# Patient Record
Sex: FEMALE | Race: WHITE
[De-identification: names, ages, dates, MRNs, and addresses within clinical notes are randomized per-mention and may not be internally consistent; named-entity substitution may affect disease eponyms.]

---

## 2017-02-09 ENCOUNTER — HOSPITAL ENCOUNTER (OUTPATIENT)
Dept: HOSPITAL 53 - M PAIN | Age: 57
End: 2017-02-09
Attending: NURSE PRACTITIONER
Payer: COMMERCIAL

## 2017-02-09 DIAGNOSIS — Z53.8: Primary | ICD-10-CM

## 2017-08-31 ENCOUNTER — HOSPITAL ENCOUNTER (OUTPATIENT)
Dept: HOSPITAL 53 - M WHC | Age: 57
End: 2017-08-31
Attending: NURSE PRACTITIONER
Payer: COMMERCIAL

## 2017-08-31 DIAGNOSIS — Z80.41: ICD-10-CM

## 2017-08-31 DIAGNOSIS — Z12.31: Primary | ICD-10-CM

## 2017-08-31 DIAGNOSIS — R14.0: ICD-10-CM

## 2017-08-31 PROCEDURE — 76856 US EXAM PELVIC COMPLETE: CPT

## 2017-08-31 PROCEDURE — 76830 TRANSVAGINAL US NON-OB: CPT

## 2017-08-31 NOTE — REPMRS
Patient History

The patient states she had a clinical breast exam in 8/2017.

Patient is postmenopausal.

Family history of pancreatic cancer in father at age 39, ovarian 

cancer in maternal aunt, colorectal cancer in maternal aunt, and 

ovarian cancer in maternal grandmother.

 

Digital Woman Screen Mammo: August 31, 2017 - Exam #: 

[id number]

Bilateral CC and MLO view(s) were taken.

 

Technologist: Alyssia Chris Technologist

Prior study comparison: June 1, 2016, left breast digital mammo 

diagnostic unilateral, performed at [facility].  

May 25, 2016, digital woman screen mammo performed at [facility].  May 4, 2015, digital woman screen mammo 

performed at [facility].

 

FINDINGS: The breast tissue is heterogeneously dense.  This may 

lower the sensitivity of mammography.  There is a moderate amount

of heterogeneously dense fibroglandular tissue which is fairly 

symmetric. There is no interval development of dominant mass, 

architectural distortion, or clustered microcalcification typical

of malignancy. There has been no change in the appearance of the

mammogram from the prior studies.

 

ASSESSMENT: BI-RADS/ACR category 1 mammogram. Negative.

 

Recommendation

Routine screening mammogram of both breasts in 1 year (for women 

over age 40).

 

This mammogram was interpreted with the aid of an FDA-approved 

computer-aided dectection system.

 

Electronically Signed By: Alek Colunga MD 08/31/17 1016

## 2017-08-31 NOTE — REP
Pelvic sonography:

 

History:  Positive family history of ovarian carcinoma.  Abdominal bloating.

 

Findings:  Transabdominal and transvaginal scanning are performed.  Uterine

dimensions are normal at 7.8 x 2.5 x 4.1 cm.  Endometrial echo is 0.5 cm thick

and centrally placed.  Nabothian cysts are seen in the cervix, the largest is 1.1

cm in diameter.

 

Normal ovaries are seen bilaterally.  Right ovarian dimension is 1.8 x 0.9 x 1.7

cm.  Left ovary measures 1.6 x 0.9 x 1.7 cm.  Doppler flow is normal in both

ovaries.  Resistive indices are 0.53 on the right and 0.56 on the left.

 

Impression:

 

Normal pelvic sonography.

## 2018-09-04 ENCOUNTER — HOSPITAL ENCOUNTER (OUTPATIENT)
Dept: HOSPITAL 53 - M WHC | Age: 58
End: 2018-09-04
Attending: NURSE PRACTITIONER
Payer: COMMERCIAL

## 2018-09-04 DIAGNOSIS — Z12.31: Primary | ICD-10-CM

## 2018-10-23 ENCOUNTER — HOSPITAL ENCOUNTER (EMERGENCY)
Dept: HOSPITAL 53 - M ED | Age: 58
Discharge: HOME | End: 2018-10-23
Payer: COMMERCIAL

## 2018-10-23 DIAGNOSIS — K21.9: ICD-10-CM

## 2018-10-23 DIAGNOSIS — R06.02: Primary | ICD-10-CM

## 2018-10-23 DIAGNOSIS — Z88.2: ICD-10-CM

## 2018-10-23 DIAGNOSIS — Z88.1: ICD-10-CM

## 2018-10-23 LAB
ALBUMIN/GLOBULIN RATIO: 1.26 (ref 1–1.93)
ALBUMIN: 4.3 GM/DL (ref 3.2–5.2)
ALKALINE PHOSPHATASE: 139 U/L (ref 45–117)
ALT SERPL W P-5'-P-CCNC: 35 U/L (ref 12–78)
ANION GAP: 7 MEQ/L (ref 8–16)
AST SERPL-CCNC: 23 U/L (ref 7–37)
BASO #: 0 10^3/UL (ref 0–0.2)
BASO %: 0.4 % (ref 0–1)
BILIRUB CONJ SERPL-MCNC: 0.2 MG/DL (ref 0–0.2)
BILIRUBIN,TOTAL: 0.6 MG/DL (ref 0.2–1)
BLOOD UREA NITROGEN: 13 MG/DL (ref 7–18)
CALCIUM LEVEL: 9.3 MG/DL (ref 8.5–10.1)
CARBON DIOXIDE LEVEL: 30 MEQ/L (ref 21–32)
CHLORIDE LEVEL: 105 MEQ/L (ref 98–107)
CK MB CFR.DF SERPL CALC: 0.02
CK SERPL-CCNC: 83 U/L (ref 26–192)
CK-MB VALUE MASS: 1.5 NG/ML (ref ?–3.6)
CREATININE FOR GFR: 0.76 MG/DL (ref 0.55–1.3)
D-DIMER QUANT: < 270 NG/ML (ref ?–500)
EOS #: 0.1 10^3/UL (ref 0–0.5)
EOSINOPHIL NFR BLD AUTO: 1 % (ref 0–3)
GFR SERPL CREATININE-BSD FRML MDRD: > 60 ML/MIN/{1.73_M2} (ref 51–?)
GLUCOSE, FASTING: 82 MG/DL (ref 70–100)
GOLD SPEC TUBE: (no result)
HEMATOCRIT: 42.3 % (ref 36–47)
HEMOGLOBIN: 14.2 G/DL (ref 12–15.5)
IMMATURE GRANULOCYTE %: 0.3 % (ref 0–3)
INR: 0.91
LYMPH #: 2 10^3/UL (ref 1.5–4.5)
LYMPH %: 30.2 % (ref 24–44)
MEAN CORPUSCULAR HEMOGLOBIN: 32.1 PG (ref 27–33)
MEAN CORPUSCULAR HGB CONC: 33.6 G/DL (ref 32–36.5)
MEAN CORPUSCULAR VOLUME: 95.5 FL (ref 80–96)
MONO #: 0.4 10^3/UL (ref 0–0.8)
MONO %: 6.4 % (ref 0–5)
NEUTROPHILS #: 4.2 10^3/UL (ref 1.8–7.7)
NEUTROPHILS %: 61.7 % (ref 36–66)
NRBC BLD AUTO-RTO: 0 % (ref 0–0)
NT-PRO BNP: 24 PG/ML (ref ?–125)
PLATELET COUNT, AUTOMATED: 295 10^3/UL (ref 150–450)
POTASSIUM SERUM: 3.7 MEQ/L (ref 3.5–5.1)
PROTHROMBIN TIME: 12.3 SECONDS (ref 12.1–14.4)
RED BLOOD COUNT: 4.43 10^6/UL (ref 4–5.4)
RED CELL DISTRIBUTION WIDTH: 12.4 % (ref 11.5–14.5)
SODIUM LEVEL: 142 MEQ/L (ref 136–145)
THYROID STIMULATING HORMONE: 1.95 UIU/ML (ref 0.36–3.74)
THYROXINE (T4): 8.7 UG/DL (ref 4.5–12)
TOTAL PROTEIN: 7.7 GM/DL (ref 6.4–8.2)
TROPONIN I: < 0.02 NG/ML (ref ?–0.1)
WHITE BLOOD COUNT: 6.8 10^3/UL (ref 4–10)

## 2018-10-23 PROCEDURE — 71046 X-RAY EXAM CHEST 2 VIEWS: CPT

## 2019-09-05 ENCOUNTER — HOSPITAL ENCOUNTER (OUTPATIENT)
Dept: HOSPITAL 53 - M WHC | Age: 59
End: 2019-09-05
Attending: NURSE PRACTITIONER
Payer: COMMERCIAL

## 2019-09-05 ENCOUNTER — HOSPITAL ENCOUNTER (OUTPATIENT)
Dept: HOSPITAL 53 - M SFHCWAGY | Age: 59
End: 2019-09-05
Attending: NURSE PRACTITIONER
Payer: COMMERCIAL

## 2019-09-05 DIAGNOSIS — Z12.31: Primary | ICD-10-CM

## 2019-09-05 DIAGNOSIS — Z12.4: Primary | ICD-10-CM

## 2019-09-05 NOTE — REPMRS
Patient History

The patient states she had a clinical breast exam in 09/2019.

Patient is postmenopausal.

Family history of ovarian cancer and colorectal cancer in 

maternal aunt, ovarian cancer in maternal grandmother, pancreatic

cancer at age 39 in father.

No Hormone Replacement Therapy

 

Digital Woman Screen Mammo: September 5, 2019 - Exam #: 

[id number]

Bilateral CC and MLO view(s) were taken.

 

Technologist: Charleen Ramos, Technologist

Prior study comparison: September 4, 2018, bilateral digital 

woman screen mammo performed at [facility].

August 31, 2017, digital woman screen mammo performed at 

[facility].  May 25, 2016, digital woman 

screen mammo performed at [facility].

 

FINDINGS: The breast tissue is heterogeneously dense.  This may 

lower the sensitivity of mammography.  There is a moderate amount

of heterogeneously dense fibroglandular tissue which is fairly 

symmetric. There is no interval development of dominant mass, 

architectural distortion, or grouped microcalcification typical 

of malignancy. There has been no change in the appearance of the 

mammogram from the prior studies.

3-D tomosynthesis shows no additional findings.

 

Assessment: BI-RADS/ACR category 1 mammogram. Negative Mammogram.

 

Recommendation

Routine screening mammogram of both breasts in 1 year (for women 

over age 40).

This patient's Lifetime Breast Cancer RIsk is estimated at 9.2 %.

This mammogram was interpreted with the aid of an FDA-approved 

computer-aided dectection system.

 

Electronically Signed By: Alek Colunga MD 09/05/19 2284

## 2019-09-09 LAB — HPV LOW VOL RFLX: (no result)

## 2020-09-29 ENCOUNTER — HOSPITAL ENCOUNTER (OUTPATIENT)
Dept: HOSPITAL 53 - M WHC | Age: 60
End: 2020-09-29
Attending: NURSE PRACTITIONER
Payer: COMMERCIAL

## 2020-09-29 DIAGNOSIS — Z80.0: ICD-10-CM

## 2020-09-29 DIAGNOSIS — Z80.41: ICD-10-CM

## 2020-09-29 DIAGNOSIS — Z12.31: Primary | ICD-10-CM

## 2020-09-29 NOTE — REPMRS
Patient History

The patient states she had a clinical breast exam in September 2020.

Family history of ovarian cancer and colorectal cancer in 

maternal aunt, ovarian cancer in maternal grandmother, pancreatic

cancer at age 39 in father.

No Hormone Replacement Therapy

 

3D TOMOSYNTHESIS WAS PERFORMED.

 

The Rosa Maria Khan lifetime risk for breast cancer is 8.9%.

 

VOLPARA DENSITY B.

 

Digital Woman Screen Mammo: September 29, 2020 - Exam #: 

[id number]

Bilateral CC and MLO view(s) were taken.

 

Technologist: RT Luna

Prior study comparison: September 5, 2019, bilateral digital 

woman screen mammo performed at [facility].  September 4, 2018, bilateral digital woman 

screen mammo performed at [facility].

 

FINDINGS: The breast tissue is heterogeneously dense.  This may 

lower the sensitivity of mammography.  There has been no change 

in the appearance of the mammogram from the prior studies.  There

is a moderate amount of residual fibroglandular tissue which is 

fairly symmetric. There is no interval development of dominant 

mass, areas of architectural distortion, or clustered 

microcalcification typical of malignancy.

 

Assessment: BI-RADS/ACR category 1 mammogram. Negative Mammogram.

 

Recommendation

Routine screening mammogram in 1 year (for women over age 40).

This mammogram was interpreted with the aid of an FDA-approved 

computer-aided dectection system.

 

Electronically Signed By: Marcio Quinonez MD 09/29/20 2748

## 2021-10-05 ENCOUNTER — HOSPITAL ENCOUNTER (OUTPATIENT)
Dept: HOSPITAL 53 - M LAB REF | Age: 61
End: 2021-10-05
Attending: ORTHOPAEDIC SURGERY
Payer: COMMERCIAL

## 2021-10-05 DIAGNOSIS — M67.441: Primary | ICD-10-CM
